# Patient Record
Sex: MALE | Race: WHITE | NOT HISPANIC OR LATINO
[De-identification: names, ages, dates, MRNs, and addresses within clinical notes are randomized per-mention and may not be internally consistent; named-entity substitution may affect disease eponyms.]

---

## 2018-12-20 PROBLEM — Z00.00 ENCOUNTER FOR PREVENTIVE HEALTH EXAMINATION: Status: ACTIVE | Noted: 2018-12-20

## 2019-01-08 ENCOUNTER — APPOINTMENT (OUTPATIENT)
Dept: UROLOGY | Facility: CLINIC | Age: 59
End: 2019-01-08
Payer: COMMERCIAL

## 2019-01-08 DIAGNOSIS — N50.819 TESTICULAR PAIN, UNSPECIFIED: ICD-10-CM

## 2019-01-08 DIAGNOSIS — R97.20 ELEVATED PROSTATE, SPECIFIC ANTIGEN [PSA]: ICD-10-CM

## 2019-01-08 DIAGNOSIS — N40.1 BENIGN PROSTATIC HYPERPLASIA WITH LOWER URINARY TRACT SYMPMS: ICD-10-CM

## 2019-01-08 DIAGNOSIS — N13.8 BENIGN PROSTATIC HYPERPLASIA WITH LOWER URINARY TRACT SYMPMS: ICD-10-CM

## 2019-01-08 PROCEDURE — 99244 OFF/OP CNSLTJ NEW/EST MOD 40: CPT

## 2019-01-09 LAB
APPEARANCE: CLEAR
BACTERIA: NEGATIVE
BILIRUBIN URINE: NEGATIVE
BLOOD URINE: NEGATIVE
COLOR: YELLOW
GLUCOSE QUALITATIVE U: NEGATIVE MG/DL
KETONES URINE: NEGATIVE
LEUKOCYTE ESTERASE URINE: NEGATIVE
MICROSCOPIC-UA: NORMAL
NITRITE URINE: NEGATIVE
PH URINE: 7
PROTEIN URINE: NEGATIVE MG/DL
PSA FREE FLD-MCNC: 27 %
PSA FREE SERPL-MCNC: 0.29 NG/ML
PSA SERPL-MCNC: 1.08 NG/ML
RED BLOOD CELLS URINE: 1 /HPF
SPECIFIC GRAVITY URINE: 1.02
SQUAMOUS EPITHELIAL CELLS: 0 /HPF
UROBILINOGEN URINE: NEGATIVE MG/DL
WHITE BLOOD CELLS URINE: 0 /HPF

## 2019-01-10 LAB — BACTERIA UR CULT: NORMAL

## 2019-07-09 ENCOUNTER — APPOINTMENT (OUTPATIENT)
Dept: UROLOGY | Facility: CLINIC | Age: 59
End: 2019-07-09

## 2023-04-17 ENCOUNTER — NON-APPOINTMENT (OUTPATIENT)
Age: 63
End: 2023-04-17

## 2023-04-17 ENCOUNTER — APPOINTMENT (OUTPATIENT)
Dept: ORTHOPEDIC SURGERY | Facility: CLINIC | Age: 63
End: 2023-04-17
Payer: COMMERCIAL

## 2023-04-17 VITALS
SYSTOLIC BLOOD PRESSURE: 141 MMHG | HEART RATE: 66 BPM | BODY MASS INDEX: 25.92 KG/M2 | WEIGHT: 175 LBS | DIASTOLIC BLOOD PRESSURE: 89 MMHG | HEIGHT: 69 IN

## 2023-04-17 DIAGNOSIS — M72.2 PLANTAR FASCIAL FIBROMATOSIS: ICD-10-CM

## 2023-04-17 PROCEDURE — 99204 OFFICE O/P NEW MOD 45 MIN: CPT

## 2023-04-17 NOTE — ADDENDUM
[FreeTextEntry1] : I, GUTIERREZ RAMIREZ, acted solely as a scribe for Dr. Rodrigo Graham on this date 04/17/2023  .\par  \par All medical record entries made by the Scribe were at my, Dr. Rodrigo Graham, direction and personally dictated by me on 04/17/2023 . I have reviewed the chart and agree that the record accurately reflects my personal performance of the history, physical exam, assessment and plan. I have also personally directed, reviewed, and agreed with the chart.

## 2023-04-17 NOTE — PHYSICAL EXAM
[de-identified] : Right foot Physical Examination:\par \par General: Alert and oriented x3.  In no acute distress.  Pleasant in nature with a normal affect.  No apparent respiratory distress. \par Erythema, Warmth, Rubor: Negative\par Swelling: Negative\par \par ROM Ankle:\par 1. Dorsiflexion: 10 degrees\par 2. Plantarflexion: 40 degrees\par 3. Inversion: 30 degrees\par 4. Eversion: 30 degrees\par \par ROM of digits: Normal\par \par Pes Planus: Negative\par Pes Cavus: Negative\par \par Bunion: Negative\par Asuncion's Bunion (Bunionette): Negative\par Hammer Toe Deformity/Deformities: Negative\par \par Tenderness to Palpation: Plantar Medial Tenderness\par 1. Heel Pain: Negative\par 2. Midfoot Pain: Negative\par 3. First MTP Joint: Negative\par 4. Lis Franc Joint: Negative\par \par Tenderness Metatarsals:\par 1st MT: Negative\par 2nd MT: Negative\par 3rd MT: Negative\par 4th MT: Negative\par 5th MT: Negative\par Base of the 5th MT: Negative\par \par Ligament Pain:\par 1. Lis Franc Ligament: Negative\par 2. Plantar Fascia Ligament: +\par \par Strength: \par 5/5 TA/GS/EHL/FHL/EDL/ADD/ABD\par \par Pulses: 2+ DP/PT Pulses\par \par Capillary Refill Toes: <2 seconds\par \par Neuro: Intact motor and sensory throughout\par \par Additional Test:\par 1. Palacios's Squeeze Test: Negative\par 2. Calcaneal Squeeze Test: Negative [de-identified] : Office Location: [address] [place] [address], [place], [facility]\par Office Phone: [phone]\par Office Fax: [phone]\par PATIENT NAME: Johnson Wright\par PATIENT PHONE NUMBER: [phone]\par PATIENT ID: [id number]\par : 1960\par DATE OF EXAM: 2023\par R. Phys. Name: Donny Gray\par R. Phys. Address: [address] [place] [address], [address], [place], [address]\par R. Phys. Phone: [phone]\par MRI-RIGHT ANKLE NON CONTRAST\par \par HISTORY: M79.671 Right foot pain M25.571 Right ankle pain\par \par TECHNIQUE: MR Imaging of the right ankle, midfoot, and hindfoot/heel performed\par using multiplanar multisequence technique.\par \par COMPARISON: No prior studies are available for direct comparison at the time of\par this interpretation.\par \par FINDINGS:\par \par Ligaments: The anterior and posterior distal tibiofibular ligaments are intact.\par The anterior talofibular, posterior talofibular, calcaneofibular, and deltoid\par ligaments are unremarkable.\par \par Tendons: The peroneus brevis tendon has a flattened appearance at the level of\par the lateral malleolus, consistent with the presence of a longitudinal split tear\par with distal reconstitution. No high-grade tears are identified.\par \par Bones: There is no evidence for osteochondral lesions at the tibiotalar joint.\par No tarsal coalition is present. The bone marrow signal is overall age\par appropriate.\par \par Sinus Tarsi: The sinus tarsi is within normal limits.\par \par Plantar Fascia: Moderate scarring/thickening is present at the origin of the\par plantar fascia, with partial tearing. Adjacent soft tissue edema. A small\par plantar calcaneal enthesophyte is present.\par \par There is no evidence for compression on the anterior or posterior tarsal\par tunnels.\par \par IMPRESSION:\par \par \par Moderate scarring/fasciitis at the origin of the plantar fascia, with partial\par tearing and adjacent soft tissue edema.\par \par Longitudinal split tear of the peroneus brevis tendon with distal\par reconstitution.\par \par Signed by: Teddy Perdue MD\par Signed Date: 2023 4:25 PM EST\par \par \par \par SIGNED BY: Teddy Perdue M.D., Ext. [id number] 2023 04:25 PM\par \par ADDENDUM:\par Please note that mild bone marrow edema is present in the plantar aspect of the\par calcaneus at level of the plantar calcaneal enthesophyte and origin of the\par plantar fascia.\par \par Signed by: Teddy Perdue MD\par Signed Date: 2023 9:54 AM EST\par \par \par \par IMPRESSION:\par \par \par Moderate scarring/fasciitis at the origin of the plantar fascia, with partial\par tearing and adjacent soft tissue edema.\par \par Longitudinal split tear of the peroneus brevis tendon with distal\par reconstitution.\par \par Signed by: Teddy Perdue MD\par Signed Date: 2023 4:25 PM EST\par \par \par \par SIGNED BY: Teddy Perdue M.D., Ext. [id number] 2023 04:25 PM\par \par ADDENDUM:\par Please note that mild bone marrow edema is present in the plantar aspect of the\par calcaneus at level of the plantar calcaneal enthesophyte and origin of the\par plantar fascia.\par \par Signed by: Teddy Perdue MD\par Signed Date: 2023 9:54 AM EST\par \par

## 2023-04-17 NOTE — HISTORY OF PRESENT ILLNESS
[FreeTextEntry1] : The patient is a 62-year-old male who presents for right foot plantar fasciitis for the past 5 months.  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  The patient has not had a cortisone injection or any type of invasive treatments performed at this time.  He was being treated by a podiatrist.  Patient does have an MRI of the right ankle which is a recent to review.  Patient is wearing regular flat sneakers, walking without assistance.  Patient does have pain daily.  No numbness or tingling in the ankle or foot.  No other complaints.

## 2023-04-17 NOTE — DISCUSSION/SUMMARY
[de-identified] : Today I had a lengthy discussion with the patient regarding their right foot Plantar Fascitis. I have addressed all the patient's concerns surrounding the pathology of their condition. MRI results were reviewed and interpreted by me today in office. I advised the patient to utilize gel cup inserts in the heels of their shoes, as well as an OTC dorsal hybrid night splint to facilitate stretching in the evening. I advised the patient to utilize a frozen water bottle or golf ball as a foot roller/massager. A brief and detailed discussion was held about Shockwave therapy vs. US cortizone injection and what the associated risks and benefits. A discussion was had about a possible US guided cortisone injection for the right foot. The patient would like to move forward with the procedure. A prescription was given in the office today. I recommend that the patient utilize ice, NSAIDS PRN, and heat. They can also elevate their right foot above the level of the heart. \par \par I recommend the patient follow up PRN to reassess their condition. \par \par The patient understood and verbally agreed to the treatment plan. All of their questions were answered and they were satisfied with the visit. The patient should call the office if they have any questions or experience worsening symptoms.

## 2023-04-20 ENCOUNTER — APPOINTMENT (OUTPATIENT)
Dept: ORTHOPEDIC SURGERY | Facility: CLINIC | Age: 63
End: 2023-04-20

## 2024-04-08 ENCOUNTER — APPOINTMENT (OUTPATIENT)
Dept: SURGERY | Facility: CLINIC | Age: 64
End: 2024-04-08
Payer: COMMERCIAL

## 2024-04-08 VITALS
HEIGHT: 69 IN | BODY MASS INDEX: 26.36 KG/M2 | WEIGHT: 178 LBS | OXYGEN SATURATION: 97 % | HEART RATE: 64 BPM | SYSTOLIC BLOOD PRESSURE: 139 MMHG | DIASTOLIC BLOOD PRESSURE: 83 MMHG

## 2024-04-08 DIAGNOSIS — Z78.9 OTHER SPECIFIED HEALTH STATUS: ICD-10-CM

## 2024-04-08 DIAGNOSIS — N63.20 UNSPECIFIED LUMP IN THE LEFT BREAST, UNSPECIFIED QUADRANT: ICD-10-CM

## 2024-04-08 DIAGNOSIS — Z86.79 PERSONAL HISTORY OF OTHER DISEASES OF THE CIRCULATORY SYSTEM: ICD-10-CM

## 2024-04-08 DIAGNOSIS — Z87.19 PERSONAL HISTORY OF OTHER DISEASES OF THE DIGESTIVE SYSTEM: ICD-10-CM

## 2024-04-08 PROCEDURE — 99202 OFFICE O/P NEW SF 15 MIN: CPT

## 2024-04-08 RX ORDER — CHOLECALCIFEROL (VITAMIN D3) 25 MCG
25 MCG TABLET ORAL
Refills: 0 | Status: ACTIVE | COMMUNITY

## 2024-04-08 RX ORDER — OMEPRAZOLE 40 MG/1
40 CAPSULE, DELAYED RELEASE ORAL
Refills: 0 | Status: ACTIVE | COMMUNITY

## 2024-04-08 RX ORDER — SEMAGLUTIDE 1.34 MG/ML
2 INJECTION, SOLUTION SUBCUTANEOUS
Refills: 0 | Status: ACTIVE | COMMUNITY

## 2024-04-08 RX ORDER — TELMISARTAN 80 MG/1
80 TABLET ORAL
Refills: 0 | Status: ACTIVE | COMMUNITY

## 2024-04-08 RX ORDER — OMEGA-3/DHA/EPA/FISH OIL 1200 MG
1200 CAPSULE ORAL
Refills: 0 | Status: ACTIVE | COMMUNITY

## 2024-04-08 NOTE — PHYSICAL EXAM
[Normocephalic] : normocephalic [No Supraclavicular Adenopathy] : no supraclavicular adenopathy [Clear to Auscultation Bilat] : clear to auscultation bilaterally [Normal Sinus Rhythm] : normal sinus rhythm [Normal S1, S2] : normal S1 and S2 [Examined in the supine and seated position] : examined in the supine and seated position [Symmetrical] : symmetrical [No dominant masses] : no dominant masses in right breast  [No Nipple Retraction] : no left nipple retraction [No Nipple Discharge] : no left nipple discharge [Breast Mass Right Breast ___cm] : no masses [___cm] : ~M [unfilled] ~Ucm upper outer quadrant mass [Breast Nipple Inversion] : nipples not inverted [Breast Nipple Retraction] : nipples not retracted [Breast Nipple Flattening] : nipples not flattened [Breast Nipple Fissures] : nipples not fissured [Breast Abnormal Lactation (Galactorrhea)] : no galactorrhea [Breast Abnormal Secretion Bloody Fluid] : no bloody discharge [Breast Abnormal Secretion Serous Fluid] : no serous discharge [Breast Abnormal Secretion Opalescent Fluid] : no milky discharge [No Axillary Lymphadenopathy] : no left axillary lymphadenopathy [No Edema] : no edema [No Rashes] : no rashes [No Ulceration] : no ulceration [de-identified] : small mass near areola Left

## 2024-04-08 NOTE — REVIEW OF SYSTEMS
[Heartburn] : heartburn [Nocturia] : nocturia [Negative] : Heme/Lymph [FreeTextEntry8] : h/o Prostate Nodule, as Dr. EILEEN Breen (PCP).   Pt. Pending f/u Dr. Rodriguez () this week. [de-identified] : h/o BCC, SCC, Tx with Mohs

## 2024-04-08 NOTE — HISTORY OF PRESENT ILLNESS
[FreeTextEntry1] : This 62 yo M with two-wk h/o Left Breast tenderness and swelling.  Patient denies fever, chills, redness or nipple discharge.   Patient denies h/o steroid usage or taking oral supplements.   Risk Factors: No Family h/o Breast or Ovarian CA